# Patient Record
Sex: MALE | Race: WHITE | NOT HISPANIC OR LATINO | Employment: UNEMPLOYED | URBAN - METROPOLITAN AREA
[De-identification: names, ages, dates, MRNs, and addresses within clinical notes are randomized per-mention and may not be internally consistent; named-entity substitution may affect disease eponyms.]

---

## 2024-04-28 ENCOUNTER — APPOINTMENT (OUTPATIENT)
Dept: ULTRASOUND IMAGING | Facility: CLINIC | Age: 64
End: 2024-04-28
Attending: EMERGENCY MEDICINE

## 2024-04-28 ENCOUNTER — HOSPITAL ENCOUNTER (EMERGENCY)
Facility: CLINIC | Age: 64
Discharge: HOME | End: 2024-04-28
Attending: EMERGENCY MEDICINE | Admitting: EMERGENCY MEDICINE

## 2024-04-28 VITALS
DIASTOLIC BLOOD PRESSURE: 72 MMHG | TEMPERATURE: 97.6 F | SYSTOLIC BLOOD PRESSURE: 125 MMHG | HEART RATE: 78 BPM | RESPIRATION RATE: 20 BRPM | WEIGHT: 155 LBS | OXYGEN SATURATION: 96 %

## 2024-04-28 DIAGNOSIS — R11.11 VOMITING WITHOUT NAUSEA, UNSPECIFIED VOMITING TYPE: ICD-10-CM

## 2024-04-28 DIAGNOSIS — K82.4 GALLBLADDER POLYP: ICD-10-CM

## 2024-04-28 DIAGNOSIS — D18.03 LIVER HEMANGIOMA: ICD-10-CM

## 2024-04-28 DIAGNOSIS — E86.0 DEHYDRATION: ICD-10-CM

## 2024-04-28 LAB
ALBUMIN SERPL BCG-MCNC: 4.4 G/DL (ref 3.5–5.2)
ALP SERPL-CCNC: 76 U/L (ref 40–150)
ALT SERPL W P-5'-P-CCNC: 8 U/L (ref 0–70)
ANION GAP SERPL CALCULATED.3IONS-SCNC: 15 MMOL/L (ref 7–15)
ANION GAP SERPL CALCULATED.3IONS-SCNC: 22 MMOL/L (ref 7–15)
APTT PPP: 27 SECONDS (ref 22–38)
AST SERPL W P-5'-P-CCNC: 12 U/L (ref 0–45)
B-OH-BUTYR SERPL-SCNC: 1.9 MMOL/L
BASOPHILS # BLD AUTO: 0 10E3/UL (ref 0–0.2)
BASOPHILS NFR BLD AUTO: 0 %
BILIRUB SERPL-MCNC: 0.6 MG/DL
BUN SERPL-MCNC: 65.3 MG/DL (ref 8–23)
BUN SERPL-MCNC: 71.9 MG/DL (ref 8–23)
CALCIUM SERPL-MCNC: 8.2 MG/DL (ref 8.8–10.2)
CALCIUM SERPL-MCNC: 9.4 MG/DL (ref 8.8–10.2)
CHLORIDE SERPL-SCNC: 87 MMOL/L (ref 98–107)
CHLORIDE SERPL-SCNC: 96 MMOL/L (ref 98–107)
CREAT SERPL-MCNC: 1.29 MG/DL (ref 0.67–1.17)
CREAT SERPL-MCNC: 1.5 MG/DL (ref 0.67–1.17)
DEPRECATED HCO3 PLAS-SCNC: 18 MMOL/L (ref 22–29)
DEPRECATED HCO3 PLAS-SCNC: 20 MMOL/L (ref 22–29)
EGFRCR SERPLBLD CKD-EPI 2021: 52 ML/MIN/1.73M2
EGFRCR SERPLBLD CKD-EPI 2021: 62 ML/MIN/1.73M2
EOSINOPHIL # BLD AUTO: 0.1 10E3/UL (ref 0–0.7)
EOSINOPHIL NFR BLD AUTO: 1 %
ERYTHROCYTE [DISTWIDTH] IN BLOOD BY AUTOMATED COUNT: 11.8 % (ref 10–15)
FLUAV RNA SPEC QL NAA+PROBE: NEGATIVE
FLUBV RNA RESP QL NAA+PROBE: NEGATIVE
GLUCOSE BLDC GLUCOMTR-MCNC: 245 MG/DL (ref 70–99)
GLUCOSE BLDC GLUCOMTR-MCNC: 295 MG/DL (ref 70–99)
GLUCOSE SERPL-MCNC: 213 MG/DL (ref 70–99)
GLUCOSE SERPL-MCNC: 271 MG/DL (ref 70–99)
HCO3 BLDV-SCNC: 22 MMOL/L (ref 21–28)
HCT VFR BLD AUTO: 41.3 % (ref 40–53)
HGB BLD-MCNC: 14.2 G/DL (ref 13.3–17.7)
IMM GRANULOCYTES # BLD: 0 10E3/UL
IMM GRANULOCYTES NFR BLD: 0 %
INR PPP: 0.96 (ref 0.85–1.15)
LACTATE BLD-SCNC: 1.3 MMOL/L
LIPASE SERPL-CCNC: 36 U/L (ref 13–60)
LYMPHOCYTES # BLD AUTO: 1.5 10E3/UL (ref 0.8–5.3)
LYMPHOCYTES NFR BLD AUTO: 12 %
MAGNESIUM SERPL-MCNC: 2 MG/DL (ref 1.7–2.3)
MCH RBC QN AUTO: 30.9 PG (ref 26.5–33)
MCHC RBC AUTO-ENTMCNC: 34.4 G/DL (ref 31.5–36.5)
MCV RBC AUTO: 90 FL (ref 78–100)
MONOCYTES # BLD AUTO: 0.9 10E3/UL (ref 0–1.3)
MONOCYTES NFR BLD AUTO: 7 %
NEUTROPHILS # BLD AUTO: 10.4 10E3/UL (ref 1.6–8.3)
NEUTROPHILS NFR BLD AUTO: 80 %
NRBC # BLD AUTO: 0 10E3/UL
NRBC BLD AUTO-RTO: 0 /100
PCO2 BLDV: 38 MM HG (ref 40–50)
PH BLDV: 7.37 [PH] (ref 7.32–7.43)
PLATELET # BLD AUTO: 333 10E3/UL (ref 150–450)
PO2 BLDV: 60 MM HG (ref 25–47)
POTASSIUM SERPL-SCNC: 5 MMOL/L (ref 3.4–5.3)
POTASSIUM SERPL-SCNC: 5.3 MMOL/L (ref 3.4–5.3)
PROT SERPL-MCNC: 8.2 G/DL (ref 6.4–8.3)
RBC # BLD AUTO: 4.59 10E6/UL (ref 4.4–5.9)
RSV RNA SPEC NAA+PROBE: NEGATIVE
SAO2 % BLDV: 90 % (ref 70–75)
SARS-COV-2 RNA RESP QL NAA+PROBE: NEGATIVE
SODIUM SERPL-SCNC: 127 MMOL/L (ref 135–145)
SODIUM SERPL-SCNC: 131 MMOL/L (ref 135–145)
WBC # BLD AUTO: 13 10E3/UL (ref 4–11)

## 2024-04-28 PROCEDURE — 83735 ASSAY OF MAGNESIUM: CPT | Performed by: EMERGENCY MEDICINE

## 2024-04-28 PROCEDURE — 93005 ELECTROCARDIOGRAM TRACING: CPT

## 2024-04-28 PROCEDURE — 76705 ECHO EXAM OF ABDOMEN: CPT

## 2024-04-28 PROCEDURE — 85730 THROMBOPLASTIN TIME PARTIAL: CPT | Performed by: EMERGENCY MEDICINE

## 2024-04-28 PROCEDURE — 99285 EMERGENCY DEPT VISIT HI MDM: CPT | Mod: 25

## 2024-04-28 PROCEDURE — 96375 TX/PRO/DX INJ NEW DRUG ADDON: CPT

## 2024-04-28 PROCEDURE — 85610 PROTHROMBIN TIME: CPT | Performed by: EMERGENCY MEDICINE

## 2024-04-28 PROCEDURE — 36415 COLL VENOUS BLD VENIPUNCTURE: CPT | Performed by: EMERGENCY MEDICINE

## 2024-04-28 PROCEDURE — 96374 THER/PROPH/DIAG INJ IV PUSH: CPT

## 2024-04-28 PROCEDURE — 96361 HYDRATE IV INFUSION ADD-ON: CPT

## 2024-04-28 PROCEDURE — 85025 COMPLETE CBC W/AUTO DIFF WBC: CPT | Performed by: EMERGENCY MEDICINE

## 2024-04-28 PROCEDURE — 82962 GLUCOSE BLOOD TEST: CPT

## 2024-04-28 PROCEDURE — 83690 ASSAY OF LIPASE: CPT | Performed by: EMERGENCY MEDICINE

## 2024-04-28 PROCEDURE — 82010 KETONE BODYS QUAN: CPT | Performed by: EMERGENCY MEDICINE

## 2024-04-28 PROCEDURE — 87637 SARSCOV2&INF A&B&RSV AMP PRB: CPT | Performed by: EMERGENCY MEDICINE

## 2024-04-28 PROCEDURE — C9113 INJ PANTOPRAZOLE SODIUM, VIA: HCPCS | Performed by: EMERGENCY MEDICINE

## 2024-04-28 PROCEDURE — 82803 BLOOD GASES ANY COMBINATION: CPT

## 2024-04-28 PROCEDURE — 80053 COMPREHEN METABOLIC PANEL: CPT | Performed by: EMERGENCY MEDICINE

## 2024-04-28 PROCEDURE — 258N000003 HC RX IP 258 OP 636: Performed by: EMERGENCY MEDICINE

## 2024-04-28 PROCEDURE — 250N000011 HC RX IP 250 OP 636: Performed by: EMERGENCY MEDICINE

## 2024-04-28 PROCEDURE — 87040 BLOOD CULTURE FOR BACTERIA: CPT | Performed by: EMERGENCY MEDICINE

## 2024-04-28 RX ORDER — ONDANSETRON 2 MG/ML
4 INJECTION INTRAMUSCULAR; INTRAVENOUS ONCE
Status: COMPLETED | OUTPATIENT
Start: 2024-04-28 | End: 2024-04-28

## 2024-04-28 RX ADMIN — SODIUM CHLORIDE 1000 ML: 9 INJECTION, SOLUTION INTRAVENOUS at 12:35

## 2024-04-28 RX ADMIN — ONDANSETRON 4 MG: 2 INJECTION INTRAMUSCULAR; INTRAVENOUS at 12:35

## 2024-04-28 RX ADMIN — PANTOPRAZOLE SODIUM 40 MG: 40 INJECTION, POWDER, FOR SOLUTION INTRAVENOUS at 12:58

## 2024-04-28 RX ADMIN — SODIUM CHLORIDE 1000 ML: 9 INJECTION, SOLUTION INTRAVENOUS at 14:18

## 2024-04-28 ASSESSMENT — COLUMBIA-SUICIDE SEVERITY RATING SCALE - C-SSRS
6. HAVE YOU EVER DONE ANYTHING, STARTED TO DO ANYTHING, OR PREPARED TO DO ANYTHING TO END YOUR LIFE?: NO
1. IN THE PAST MONTH, HAVE YOU WISHED YOU WERE DEAD OR WISHED YOU COULD GO TO SLEEP AND NOT WAKE UP?: NO
2. HAVE YOU ACTUALLY HAD ANY THOUGHTS OF KILLING YOURSELF IN THE PAST MONTH?: NO

## 2024-04-28 ASSESSMENT — ACTIVITIES OF DAILY LIVING (ADL)
ADLS_ACUITY_SCORE: 35
ADLS_ACUITY_SCORE: 33

## 2024-04-28 NOTE — ED TRIAGE NOTES
"Pt presents with family for eval of epigatrium pain for a few days, with vomiting. Reporting having \"coffee color\" emesis, unable to keep food down, reported not feeling well due to high BG, between 200-300 at home regardless of fasting. Appears lethargic at triage, families reported hx of neck nerve injury, has been weaker since then. Pt did report increase weakness      Triage Assessment (Adult)       Row Name 04/28/24 1152          Triage Assessment    Airway WDL WDL        Respiratory WDL    Respiratory WDL WDL        Skin Circulation/Temperature WDL    Skin Circulation/Temperature WDL WDL        Cardiac WDL    Cardiac WDL WDL        Peripheral/Neurovascular WDL    Peripheral Neurovascular WDL WDL        Cognitive/Neuro/Behavioral WDL    Cognitive/Neuro/Behavioral WDL WDL                     "
350

## 2024-04-28 NOTE — ED PROVIDER NOTES
History     Chief Complaint:  Nausea & Vomiting       SAMINA Collins is a 64 year old male with a history of tetraparesis who presents to the ED with his family for evaluation of nausea and vomiting. The patient states he has not feeling well with epigastric abdominal pain, weakness, nausea, and dark emesis for about three days. Notes he normally eats only Kosher foods but three days ago had non-kosher foods.  He states that he was with a friend ate potatoes which he typically does not eat and then shortly after developed symptoms of nausea and vomiting.  Since symptom onset, he has not been able to eat or drink much. His family states his blood sugar has been in the 2-300's for about four days, and at 252 when seen in clinic earlier today, despite fasting and taking his normal metformin. States that his blood sugar is normally 80-90 with fasting. Denies fever, diarrhea, hematuria, dysuria, known sick exposures, and changes with self-catheterization. Denies history of gastrointestinal bleeding, gastrointestinal ulcers, liver diease, amenia, blood loss, pancreatic issues, abdominal surgeries. The patient is not on blood thinners.    Independent Historian:    Family - They report as noted above.    Review of External Notes:  Limited prior medical records.  Last seen in the ER July 2023 for urinary tract infection.    Medications:    Glipizide  Metformin     Past Medical History:    Tetraparesis   Neurogenic bladder  Neurogenic bowel  Central cord disorder     Physical Exam   Patient Vitals for the past 24 hrs:   BP Temp Temp src Pulse Resp SpO2 Weight   04/28/24 1529 125/72 -- -- 78 -- 96 % --   04/28/24 1459 125/70 -- -- 74 -- 96 % --   04/28/24 1430 135/75 -- -- 80 -- 96 % --   04/28/24 1321 134/70 -- -- 74 -- 96 % --   04/28/24 1300 121/67 -- -- 80 -- 97 % --   04/28/24 1155 -- -- -- -- -- -- 70.3 kg (155 lb)   04/28/24 1154 117/88 97.6  F (36.4  C) Oral 97 20 97 % --        Physical Exam  Vitals  reviewed.   Constitutional:       General: He is not in acute distress.     Appearance: He is not ill-appearing.   HENT:      Head: Normocephalic and atraumatic.   Eyes:      Extraocular Movements: Extraocular movements intact.      Pupils: Pupils are equal, round, and reactive to light.   Cardiovascular:      Rate and Rhythm: Normal rate and regular rhythm.   Pulmonary:      Effort: Pulmonary effort is normal. No respiratory distress.      Breath sounds: Normal breath sounds. No wheezing.   Abdominal:      Palpations: Abdomen is soft.      Tenderness: There is no abdominal tenderness. There is no right CVA tenderness, left CVA tenderness or guarding.   Musculoskeletal:      Cervical back: Normal range of motion.   Skin:     General: Skin is warm and dry.   Neurological:      Mental Status: He is alert and oriented to person, place, and time.      GCS: GCS eye subscore is 4. GCS verbal subscore is 5. GCS motor subscore is 6.   Psychiatric:         Behavior: Behavior normal.           Emergency Department Course     Laboratory:  Labs Ordered and Resulted from Time of ED Arrival to Time of ED Departure   COMPREHENSIVE METABOLIC PANEL - Abnormal       Result Value    Sodium 127 (*)     Potassium 5.0      Carbon Dioxide (CO2) 18 (*)     Anion Gap 22 (*)     Urea Nitrogen 71.9 (*)     Creatinine 1.50 (*)     GFR Estimate 52 (*)     Calcium 9.4      Chloride 87 (*)     Glucose 271 (*)     Alkaline Phosphatase 76      AST 12      ALT 8      Protein Total 8.2      Albumin 4.4      Bilirubin Total 0.6     KETONE BETA-HYDROXYBUTYRATE QUANTITATIVE, RAPID - Abnormal    Ketone (Beta-Hydroxybutyrate) Quantitative 1.90 (*)    GLUCOSE BY METER - Abnormal    GLUCOSE BY METER POCT 295 (*)    CBC WITH PLATELETS AND DIFFERENTIAL - Abnormal    WBC Count 13.0 (*)     RBC Count 4.59      Hemoglobin 14.2      Hematocrit 41.3      MCV 90      MCH 30.9      MCHC 34.4      RDW 11.8      Platelet Count 333      % Neutrophils 80      %  Lymphocytes 12      % Monocytes 7      % Eosinophils 1      % Basophils 0      % Immature Granulocytes 0      NRBCs per 100 WBC 0      Absolute Neutrophils 10.4 (*)     Absolute Lymphocytes 1.5      Absolute Monocytes 0.9      Absolute Eosinophils 0.1      Absolute Basophils 0.0      Absolute Immature Granulocytes 0.0      Absolute NRBCs 0.0     ISTAT GASES LACTATE VENOUS POCT - Abnormal    Lactic Acid POCT 1.3      Bicarbonate Venous POCT 22      O2 Sat, Venous POCT 90 (*)     pCO2 Venous POCT 38 (*)     pH Venous POCT 7.37      pO2 Venous POCT 60 (*)    GLUCOSE BY METER - Abnormal    GLUCOSE BY METER POCT 245 (*)    BASIC METABOLIC PANEL - Abnormal    Sodium 131 (*)     Potassium 5.3      Chloride 96 (*)     Carbon Dioxide (CO2) 20 (*)     Anion Gap 15      Urea Nitrogen 65.3 (*)     Creatinine 1.29 (*)     GFR Estimate 62      Calcium 8.2 (*)     Glucose 213 (*)    INR - Normal    INR 0.96     PARTIAL THROMBOPLASTIN TIME - Normal    aPTT 27     MAGNESIUM - Normal    Magnesium 2.0     LIPASE - Normal    Lipase 36     INFLUENZA A/B, RSV, & SARS-COV2 PCR - Normal    Influenza A PCR Negative      Influenza B PCR Negative      RSV PCR Negative      SARS CoV2 PCR Negative     BLOOD CULTURE   BLOOD CULTURE        Emergency Department Course & Assessments:    Interventions:  Medications   sodium chloride 0.9% BOLUS 1,000 mL (0 mLs Intravenous Stopped 4/28/24 1359)   ondansetron (ZOFRAN) injection 4 mg (4 mg Intravenous $Given 4/28/24 1235)   pantoprazole (PROTONIX) IV push injection 40 mg (40 mg Intravenous $Given 4/28/24 1258)   sodium chloride 0.9% BOLUS 1,000 mL (0 mLs Intravenous Stopped 4/28/24 1511)        Assessments:  1208 I obtained history and performed a physical exam as noted above.     Independent Interpretation (X-rays, CTs, rhythm strip):  None      ED Course as of 04/28/24 1706   Sun Apr 28, 2024   1219    1244 pH Venous POCT: 7.37   1249 WBC(!): 13.0   1249 Hemoglobin: 14.2   1329 Ketone  Quantitative(!!): 1.90   1502 Reassessed the patient he is able to tolerate p.o. without any difficulty.  States he is feeling better.  Discussed plan for repeat BMP continue observation in the ER.  Explained to the patient if he is doing better no persistent vomiting and lab work improves plan for discharge.  He agrees with plan of care.  He is comfortable with discharge.       Social Determinants of Health affecting care:  None     Disposition:  The patient was discharged.    Impression & Plan         Medical Decision Makin-year-old male presenting today with nausea vomiting.  He states he has had symptoms for the last 3 days.  Symptoms started after eating potatoes.  He reports just epigastric abdominal pain.  His abdomen soft nontender all throughout.  He is hemodynamically stable.  He was given IV fluids on arrival.  Initial glucose was 295.  He reports being compliant with his metformin.  Basic blood work obtained.  He had improvement after initial fluid bolus as well as Zofran.  He had evidence of dehydration, hyperglycemia.  He was given 2 L total of normal saline.  An ultrasound of the right upper quadrant showed no signs of cholecystitis but did show liver hemangioma as well as gallbladder polyp.  Results were discussed with the patient and family.  A repeat BMP showed improvement of his electrolytes including sodium and glucose.  I discussed results at length with him.  Patient states he is comfortable with discharge home at this time.  He was able to tolerate p.o. without any difficulty.  Discussed bland diet today.  Discussed with him care instructions and return precautions.  They verbalized understanding agreement.  Throughout his 4 and half hour stay in the ER he had no episodes of vomiting.  He remained hemodynamically stable.  Hemoglobin stable.    Diagnosis:    ICD-10-CM    1. Liver hemangioma  D18.03       2. Gallbladder polyp  K82.4       3. Vomiting without nausea, unspecified vomiting  type  R11.11       4. Dehydration  E86.0            Discharge Medications:  There are no discharge medications for this patient.         Scribe Disclosure:  I, Magalie Atkins, am serving as a scribe at 12:06 PM on 4/28/2024 to document services personally performed by Neetu Moreau DO based on my observations and the provider's statements to me.    4/28/2024   Neetu Moreau DO Doan, Tiffani, DO  04/28/24 1709

## 2024-04-28 NOTE — DISCHARGE INSTRUCTIONS
Continue your previously prescribed medications    Your ultrasound showed incidental findings of a liver hemangioma as well as gallbladder polyp.  Follow-up with your doctor for further evaluation of this.    Follow-up with your primary care doctor in 1 to 2 days    Return to the ER for any worsening or concerning symptoms

## 2024-04-29 LAB
ATRIAL RATE - MUSE: 76 BPM
DIASTOLIC BLOOD PRESSURE - MUSE: NORMAL MMHG
INTERPRETATION ECG - MUSE: NORMAL
P AXIS - MUSE: 22 DEGREES
PR INTERVAL - MUSE: 146 MS
QRS DURATION - MUSE: 74 MS
QT - MUSE: 376 MS
QTC - MUSE: 423 MS
R AXIS - MUSE: 64 DEGREES
SYSTOLIC BLOOD PRESSURE - MUSE: NORMAL MMHG
T AXIS - MUSE: 61 DEGREES
VENTRICULAR RATE- MUSE: 76 BPM

## 2024-05-03 LAB
BACTERIA BLD CULT: NO GROWTH
BACTERIA BLD CULT: NO GROWTH

## 2024-07-28 ENCOUNTER — HEALTH MAINTENANCE LETTER (OUTPATIENT)
Age: 64
End: 2024-07-28

## 2025-08-10 ENCOUNTER — HEALTH MAINTENANCE LETTER (OUTPATIENT)
Age: 65
End: 2025-08-10